# Patient Record
Sex: FEMALE | Race: WHITE | NOT HISPANIC OR LATINO | Employment: OTHER | URBAN - METROPOLITAN AREA
[De-identification: names, ages, dates, MRNs, and addresses within clinical notes are randomized per-mention and may not be internally consistent; named-entity substitution may affect disease eponyms.]

---

## 2017-04-19 ENCOUNTER — OFFICE VISIT (OUTPATIENT)
Dept: NEUROLOGY | Facility: MEDICAL CENTER | Age: 47
End: 2017-04-19
Payer: COMMERCIAL

## 2017-04-19 VITALS
HEIGHT: 62 IN | BODY MASS INDEX: 24.48 KG/M2 | DIASTOLIC BLOOD PRESSURE: 64 MMHG | WEIGHT: 133 LBS | HEART RATE: 74 BPM | TEMPERATURE: 98.3 F | SYSTOLIC BLOOD PRESSURE: 106 MMHG | OXYGEN SATURATION: 98 %

## 2017-04-19 DIAGNOSIS — R55 SYNCOPE, UNSPECIFIED SYNCOPE TYPE: ICD-10-CM

## 2017-04-19 PROCEDURE — 99204 OFFICE O/P NEW MOD 45 MIN: CPT

## 2017-04-19 NOTE — PROGRESS NOTES
Neurology Consult Note  4/19/2017      Referring MD:   None listed    Patient ID:  Name:             Tiffany Wolf     YOB: 1970  Age:                 46 y.o.  female   MRN:               [id number]                                              Reason for Consult:      Episodes of syncope    History of Present Illness:    This 46-year-old lady has had rare episodes of syncope this seemed to begin with a sense of change in her hearing possibly vision and then possibly a delayed loss of consciousness. On some occasions she'll get up and try to walk off the symptoms and then pass out. Couple of the events have been post migrainous or migraines are relatively uncommon usually occur at night but can occur during the day and are somewhat less frequent since she's had some massage by a chiropractor on a regular basis. She has had a lot of athletics and has been a runner and other activities and never had an episode of loss of consciousness during athletic endeavors. There is no family history of syncope.    Review of Systems: Negative except for syncope.  Constitutional: Denies fevers, Denies weight changes  Eyes: Denies changes in vision, no eye pain  Ears/Nose/Throat/Mouth: Denies nasal congestion or sore throat   Cardiovascular: Denies chest pain, Denies palpitations   Respiratory: Denies shortness of breath , Denies cough  Gastrointestinal/Hepatic: Denies abdominal pain, nausea, vomiting, diarrhea, constipation or GI bleeding   Genitourinary: Denies dysuria or frequency  Musculoskeletal/Rheum: Denies  joint pain and swelling, No edema  Skin: Denies rash  Neurological: Migraine headaches.  Psychiatric: denies mood disorder   Endocrine: Bhavya thyroid problems  Heme/Oncology/Lymph Nodes: Denies enlarged lymph nodes, denies brusing or known bleeding disorder  All other systems were reviewed and are negative (AMA/CMS criteria)                Past Medical History:     No past medical history on  "file.    Problems addressed this visit:    Problem List Items Addressed This Visit     None      Visit Diagnoses     Syncope, unspecified syncope type         Relevant Orders     REFERRAL TO NEURODIAGNOSTICS (EEG,EP,EMG/NCS/DBS) Modality Requested: EEG     REFERRAL TO CARDIOLOGY           Past Surgical History:   No past surgical history on file.      Current Outpatient Medications:  No current outpatient prescriptions on file.     No current facility-administered medications for this visit.       Medication Allergy:  Allergies   Allergen Reactions   • Betadine [Povidone Iodine] Swelling       Family History:  No family history on file.    Social History:  Social History     Social History   • Marital Status: N/A     Spouse Name: N/A   • Number of Children: N/A   • Years of Education: N/A     Occupational History   • Not on file.     Social History Main Topics   • Smoking status: Never Smoker    • Smokeless tobacco: Not on file   • Alcohol Use: Not on file   • Drug Use: Not on file   • Sexual Activity: Not on file     Other Topics Concern   • Not on file     Social History Narrative   • No narrative on file       Physical Exam: She is a well-developed well-nourished woman who appears to be quite fit. Gait and station are normal and Romberg is negative. She has no orthostatic blood pressure drop even after standing for 3 minutes. Her optic fundi are unremarkable and pupils equal round reactive to light and visual fields are full and extraocular movements are intact. Strength in upper and lower extremities is normal reflexes are symmetric plantar responses are downgoing. There is no sensory deficit to pinprick light touch and double simultaneous tactile stimulation. Vibration and position sense are present. Coordination to finger-nose heel-to-shin testing is negative.  Vitals:   Filed Vitals:    04/19/17 1336   BP: 106/64   Pulse: 74   Temp: 36.8 °C (98.3 °F)   Height: 1.575 m (5' 2\")   Weight: 60.328 kg (133 lb) "   SpO2: 98%     General Appearance:   Weight/BMI: Body mass index is 24.32 kg/(m^2).    Neurologic Exam:   AOx3: Normal  Recent & remote memory intact: Yes  Attentive with normal coordination: Yes  Normal spontaneous speech pattern: Yes  Age appropriate fund of knowledge: Yes  Cranial nerve II intact: Normal  Cranial nerve III, IV & VI intact: Normal  Cranial nerve V intact: Normal  Cranial nerve VIII intact: Normal  Cranial nerve IX intact: Normal  Cranial nerve XI intact: Normal  Cranial nerve XII intact: Normal  No sensory deficits: Normal  DTRs intact & symmetrical: Normal   No dysdiadochokinesia or dysmetria: Normal    Eyes:  Normal optic discs: Yes  Visual field: Normal  Pupillary responses: Normal  Extraocular movement: Normal    Cardiovascular:  Normal carotid pulses bilaterally: Yes  RRR with no MRGs: Yes  No peripheral edema, pulses intact: Yes    Musculoskeletal:  Normal gait and station: Yes  Normal muscle strength: Yes  Normal muscle tone, no atrophy: Yes  No abnormal movements: Yes    Plan:   The syncopal events are of unknown cause at this point but are not related to orthostatic blood pressure changes. Given her athletic history I doubt a a cardiogenic cause but that needs to be evaluated with an event. They don't sound like seizures but we will get an EEG. I suspect it probably related to migraine and if the workup is negative give her migraine abortive drugs and maybe prevent these events from occurring again. She is safe to drive at this point. I'll see her back after the evaluations are complete.    Total length of time of this visit was 40 minutes of which greater than 50% was spent counseling the patient/family and coordinating care.    Thank you for allowing me to participate in his care.    Sincerely yours,        Macho Sunshine MD, PhD

## 2017-04-19 NOTE — MR AVS SNAPSHOT
"Tiffany Wolf   2017 1:40 PM   Office Visit   MRN: [id number]    Department:  [facility]   Dept Phone:  [phone]    Description:  Female : 1970   Provider:  Macho Sunshine M.D.           Reason for Visit     New Patient dizziness with vomiting      Allergies as of 2017     Allergen Noted Reactions    Betadine [Povidone Iodine] 2017   Swelling      You were diagnosed with     Syncope, unspecified syncope type   [[id number]]         Vital Signs     Blood Pressure Pulse Temperature Height Weight Body Mass Index    106/64 mmHg 74 36.8 °C (98.3 °F) 1.575 m (5' 2\") 60.328 kg (133 lb) 24.32 kg/m2    Oxygen Saturation Smoking Status                98% Never Smoker           Basic Information     Date Of Birth Sex Preferred Language          1970 Female English        Health Maintenance     Patient has no pending health maintenance at this time      Current Immunizations     No immunizations on file.      Below and/or attached are the medications your provider expects you to take. Review all of your home medications and newly ordered medications with your provider and/or pharmacist. Follow medication instructions as directed by your provider and/or pharmacist. Please keep your medication list with you and share with your provider. Update the information when medications are discontinued, doses are changed, or new medications (including over-the-counter products) are added; and carry medication information at all times in the event of emergency situations     Allergies:  BETADINE - Swelling               Medications  Valid as of: 2017 -  2:09 PM    Generic Name Brand Name Tablet Size Instructions for use    .                 Medicines prescribed today were sent to:     [id number] - [place], [place] - [address] AT [facility] [address] [facility]. & [facility]    [address] [place] [address]    Phone: [phone] Fax: [phone]    Open 24 Hours?: No  "      Medication refill instructions:       If your prescription bottle indicates you have medication refills left, it is not necessary to call your provider’s office. Please contact your pharmacy and they will refill your medication.    If your prescription bottle indicates you do not have any refills left, you may request refills at any time through one of the following ways: The online myJambi system (except Urgent Care), by calling your provider’s office, or by asking your pharmacy to contact your provider’s office with a refill request. Medication refills are processed only during regular business hours and may not be available until the next business day. Your provider may request additional information or to have a follow-up visit with you prior to refilling your medication.   *Please Note: Medication refills are assigned a new Rx number when refilled electronically. Your pharmacy may indicate that no refills were authorized even though a new prescription for the same medication is available at the pharmacy. Please request the medicine by name with the pharmacy before contacting your provider for a refill.        Referral     A referral request has been sent to our patient care coordination department. Please allow 3-5 business days for us to process this request and contact you either by phone or mail. If you do not hear from us by the 5th business day, please call us at [phone].           myJambi Access Code: [email or web address]  Expires: 5/18/2017  9:47 AM    myJambi  A secure, online tool to manage your health information     [employer]’s myJambi® is a secure, online tool that connects you to your personalized health information from the privacy of your home -- day or night - making it very easy for you to manage your healthcare. Once the activation process is completed, you can even access your medical information using the myJambi nikki, which is available for free in the Apple Nikki store or [employer]  Play store.     [facility] ([employer]) provides the following levels of access (as shown below):   My Chart Features   [facility] Doctor [facility]  Specialists [facility]  Urgent  Care [facility]  Primary Care  Doctor   Email your healthcare team securely and privately 24/7 X X X    Manage appointments: schedule your next appointment; view details of past/upcoming appointments X      Request prescription refills. X      View recent personal medical records, including lab and immunizations X X X X   View health record, including health history, allergies, medications X X X X   Read reports about your outpatient visits, procedures, consult and ER notes X X X X   See your discharge summary, which is a recap of your hospital and/or ER visit that includes your diagnosis, lab results, and care plan. X X       How to register for [facility] ([employer]):  1. Go to  [email or web address].  2. Click on the Sign Up Now box, which takes you to the New Member Sign Up page. You will need to provide the following information:  a. Enter your [facility]) Access Code exactly as it appears at the top of this page. (You will not need to use this code after you’ve completed the sign-up process. If you do not sign up before the expiration date, you must request a new code.)   b. Enter your date of birth.   c. Enter your home email address.   d. Click Submit, and follow the next screen’s instructions.  3. Create a [facility] ID. This will be your [facility] login ID and cannot be changed, so think of one that is secure and easy to remember.  4. Create a [facility] password. You can change your password at any time.  5. Enter your Password Reset Question and Answer. This can be used at a later time if you forget your password.   6. Enter your e-mail address. This allows you to receive e-mail notifications when new information is available in [facility] ([facility]).  7. Click Sign Up. You can now view your health information.    For assistance activating your [facility] [employer] account, call [phone]

## 2017-07-19 ENCOUNTER — APPOINTMENT (OUTPATIENT)
Dept: NEUROLOGY | Facility: MEDICAL CENTER | Age: 47
End: 2017-07-19
Payer: COMMERCIAL

## 2017-08-09 ENCOUNTER — TELEPHONE (OUTPATIENT)
Dept: CARDIOLOGY | Facility: CLINIC | Age: 47
End: 2017-08-09

## 2017-08-09 NOTE — TELEPHONE ENCOUNTER
KERIM asking patient to call back into office to find out if she has had any recent blood work done or any recent cardiac testing. Patient has a NP appt. With TT on 8/18/2017 @ 4:15pm*MORENO

## 2019-11-22 ENCOUNTER — TELEPHONE (OUTPATIENT)
Dept: NEUROLOGY | Facility: MEDICAL CENTER | Age: 49
End: 2019-11-22

## 2019-11-22 NOTE — TELEPHONE ENCOUNTER
Received referral from [facility], provider Ann Marie Griggs. Requested that patient be seen by neuro for facial twitching. Per Dr. Chun, we do not have a provider available to treat patient. We will need to refer to another neurologist.     I called and left pt. Voicemail on 11/22 at phone number [phone]. Phone number in demographics is incorrect.

## 2020-02-13 ENCOUNTER — TELEPHONE (OUTPATIENT)
Dept: NEUROLOGY | Facility: MEDICAL CENTER | Age: 50
End: 2020-02-13

## 2020-02-13 NOTE — TELEPHONE ENCOUNTER
Left patient vm on 2/13 to follow-up on referral that was submitted to us for facial twitching. I let the patient know that we do not have a provider who sees that so we are just following up to see how she's doing and to see if she still needs a referral to a neurologist who sees facial twitching.